# Patient Record
Sex: FEMALE | Race: WHITE | NOT HISPANIC OR LATINO | Employment: FULL TIME | URBAN - METROPOLITAN AREA
[De-identification: names, ages, dates, MRNs, and addresses within clinical notes are randomized per-mention and may not be internally consistent; named-entity substitution may affect disease eponyms.]

---

## 2024-01-08 ENCOUNTER — BIOMETRIC VISIT (OUTPATIENT)
Dept: PRIMARY CARE | Facility: EXTERNAL LOCATION | Age: 29
End: 2024-01-08
Payer: COMMERCIAL

## 2024-01-08 VITALS — DIASTOLIC BLOOD PRESSURE: 70 MMHG | SYSTOLIC BLOOD PRESSURE: 110 MMHG | WEIGHT: 150 LBS | BODY MASS INDEX: 28.34 KG/M2

## 2024-01-24 ENCOUNTER — BIOMETRIC VISIT (OUTPATIENT)
Dept: PRIMARY CARE | Facility: EXTERNAL LOCATION | Age: 29
End: 2024-01-24
Payer: COMMERCIAL

## 2024-01-24 VITALS — BODY MASS INDEX: 28.08 KG/M2 | WEIGHT: 148.6 LBS

## 2024-01-24 DIAGNOSIS — Z00.8 ENCOUNTER FOR BIOMETRIC SCREENING: Primary | ICD-10-CM

## 2024-01-24 LAB — POC FINGERSTICK BLOOD GLUCOSE: 100 MG/DL (ref 70–100)

## 2024-02-02 ENCOUNTER — BIOMETRIC VISIT (OUTPATIENT)
Dept: PRIMARY CARE | Facility: EXTERNAL LOCATION | Age: 29
End: 2024-02-02
Payer: COMMERCIAL

## 2024-02-02 VITALS — BODY MASS INDEX: 27.85 KG/M2 | WEIGHT: 147.4 LBS

## 2024-02-09 ENCOUNTER — BIOMETRIC VISIT (OUTPATIENT)
Dept: PRIMARY CARE | Facility: EXTERNAL LOCATION | Age: 29
End: 2024-02-09
Payer: COMMERCIAL

## 2024-02-09 VITALS — BODY MASS INDEX: 27.85 KG/M2 | WEIGHT: 147.4 LBS

## 2024-02-23 ENCOUNTER — BIOMETRIC VISIT (OUTPATIENT)
Dept: PRIMARY CARE | Facility: EXTERNAL LOCATION | Age: 29
End: 2024-02-23
Payer: COMMERCIAL

## 2024-02-23 VITALS — BODY MASS INDEX: 27.59 KG/M2 | WEIGHT: 146 LBS

## 2024-03-08 ENCOUNTER — BIOMETRIC VISIT (OUTPATIENT)
Dept: PRIMARY CARE | Facility: EXTERNAL LOCATION | Age: 29
End: 2024-03-08
Payer: COMMERCIAL

## 2024-03-08 VITALS — WEIGHT: 143.2 LBS | BODY MASS INDEX: 27.06 KG/M2

## 2024-03-15 ENCOUNTER — BIOMETRIC VISIT (OUTPATIENT)
Dept: PRIMARY CARE | Facility: EXTERNAL LOCATION | Age: 29
End: 2024-03-15
Payer: COMMERCIAL

## 2024-03-15 VITALS — BODY MASS INDEX: 27.44 KG/M2 | WEIGHT: 145.2 LBS

## 2024-03-26 DIAGNOSIS — R11.2 NAUSEA AND VOMITING, UNSPECIFIED VOMITING TYPE: Primary | ICD-10-CM

## 2024-03-26 DIAGNOSIS — G43.809 OTHER MIGRAINE WITHOUT STATUS MIGRAINOSUS, NOT INTRACTABLE: Primary | ICD-10-CM

## 2024-03-27 RX ORDER — ONDANSETRON 8 MG/1
TABLET, ORALLY DISINTEGRATING ORAL
Qty: 30 TABLET | Refills: 2 | Status: SHIPPED | OUTPATIENT
Start: 2024-03-27

## 2024-03-27 RX ORDER — SUMATRIPTAN 50 MG/1
TABLET, FILM COATED ORAL
Qty: 9 TABLET | Refills: 1 | Status: SHIPPED | OUTPATIENT
Start: 2024-03-27

## 2024-04-05 ENCOUNTER — OFFICE VISIT (OUTPATIENT)
Dept: PRIMARY CARE | Facility: EXTERNAL LOCATION | Age: 29
End: 2024-04-05
Payer: COMMERCIAL

## 2024-04-05 VITALS
BODY MASS INDEX: 27 KG/M2 | SYSTOLIC BLOOD PRESSURE: 112 MMHG | HEART RATE: 76 BPM | RESPIRATION RATE: 16 BRPM | DIASTOLIC BLOOD PRESSURE: 60 MMHG | HEIGHT: 61 IN | WEIGHT: 143 LBS | OXYGEN SATURATION: 98 % | TEMPERATURE: 98.1 F

## 2024-04-05 DIAGNOSIS — L71.0 PERIORIFICIAL DERMATITIS: Primary | ICD-10-CM

## 2024-04-05 PROBLEM — Z00.8 ENCOUNTER FOR BIOMETRIC SCREENING: Status: RESOLVED | Noted: 2024-01-24 | Resolved: 2024-04-05

## 2024-04-05 PROBLEM — G43.909 MIGRAINE: Status: ACTIVE | Noted: 2024-04-05

## 2024-04-05 PROBLEM — K58.9 IBS (IRRITABLE BOWEL SYNDROME): Status: ACTIVE | Noted: 2024-04-05

## 2024-04-05 PROBLEM — N87.9 DYSPLASIA OF CERVIX: Status: ACTIVE | Noted: 2024-04-05

## 2024-04-05 PROBLEM — D84.9 IMMUNE DEFICIENCY DISORDER (MULTI): Status: ACTIVE | Noted: 2024-04-05

## 2024-04-05 PROBLEM — D80.1 HYPOGAMMAGLOBULINEMIA (MULTI): Status: ACTIVE | Noted: 2024-04-05

## 2024-04-05 PROBLEM — F41.9 ANXIETY: Status: ACTIVE | Noted: 2024-04-05

## 2024-04-05 PROBLEM — B02.9 HERPES ZOSTER WITHOUT COMPLICATION: Status: ACTIVE | Noted: 2024-04-05

## 2024-04-05 PROBLEM — L30.8 PRURITIC DERMATITIS: Status: ACTIVE | Noted: 2024-04-05

## 2024-04-05 RX ORDER — METRONIDAZOLE 7.5 MG/G
GEL TOPICAL DAILY
Qty: 45 G | Refills: 0 | Status: SHIPPED | OUTPATIENT
Start: 2024-04-05 | End: 2024-04-15

## 2024-04-05 ASSESSMENT — ENCOUNTER SYMPTOMS
FEVER: 0
CHILLS: 0
COUGH: 0
SHORTNESS OF BREATH: 0

## 2024-04-05 NOTE — PROGRESS NOTES
"Subjective   Patient ID: Liz Carson is a 29 y.o. female who presents for Rash (Dryness on face around nose x 2 weeks, itchy).    HPI:  Itchy, irritated rash around nose for 2 weeks. No new lotions, detergents or medications.   No other symptoms     No Known Allergies    Current Outpatient Medications on File Prior to Visit   Medication Sig    levonorgestreL-ethinyl estrad (Altavera, 28,) 0.15-0.03 mg tablet TAKE 1 TABLET BY MOUTH EVERY DAY    ondansetron ODT (Zofran-ODT) 8 mg disintegrating tablet TAKE 1/2-1 TABLET EVERY 8 HOURS AS NEEDED FOR NAUSEA AND VOMITING    SUMAtriptan (Imitrex) 50 mg tablet TAKE 1/2-1 TABLET BY MOUTH TWICE DAILY AS NEEDED FOR HEADACHE    fluticasone (Flonase) 50 mcg/actuation nasal spray Administer 2 sprays into each nostril once daily for 10 days. Shake gently. Before first use, prime pump. After use, clean tip and replace cap.     No current facility-administered medications on file prior to visit.        Past Medical History:   Diagnosis Date    Anxiety     Dysplasia of cervix     Herpes zoster without complication     Hypogammaglobulinemia (CMS/HCC)     IBS (irritable bowel syndrome)     Immune deficiency disorder (CMS/HCC)     Migraine     Pruritic dermatitis        Past Surgical History:   Procedure Laterality Date    ANTERIOR CRUCIATE LIGAMENT REPAIR Left     TONSILLECTOMY  12/15/2014    Tonsillectomy       Review of Systems   Constitutional:  Negative for chills and fever.   Respiratory:  Negative for cough and shortness of breath.    Cardiovascular:  Negative for chest pain.   Skin:  Positive for rash (itchy).       Objective   Visit Vitals  /60   Pulse 76   Temp 36.7 °C (98.1 °F)   Resp 16   Ht 1.549 m (5' 1\")   Wt 64.9 kg (143 lb)   LMP 03/20/2024   SpO2 98%   BMI 27.02 kg/m²   Smoking Status Never   BSA 1.67 m²       Physical Exam  Constitutional:       General: She is not in acute distress.     Appearance: Normal appearance. She is not ill-appearing or toxic-appearing. "   Pulmonary:      Effort: Pulmonary effort is normal.      Breath sounds: Normal breath sounds.   Skin:     General: Skin is warm.      Comments: Multiple erythematous papules and mild scale are present on the perinasal skin   Neurological:      Mental Status: She is alert.         Assessment/Plan   Diagnoses and all orders for this visit:  Periorificial dermatitis  -     metroNIDAZOLE (Metrogel) 0.75 % gel; Apply topically once daily for 10 days.    - Instructed to use gentle face soap.   - Limit makeup and avoid any new products until symptoms resolve.   - Instructed to follow up with PCP in 2 weeks if no improvement. Sooner with concerns or worsening symptoms.

## 2024-06-04 ENCOUNTER — LAB (OUTPATIENT)
Dept: LAB | Facility: LAB | Age: 29
End: 2024-06-04
Payer: COMMERCIAL

## 2024-06-04 ENCOUNTER — OFFICE VISIT (OUTPATIENT)
Dept: PRIMARY CARE | Facility: CLINIC | Age: 29
End: 2024-06-04
Payer: COMMERCIAL

## 2024-06-04 VITALS
WEIGHT: 144.8 LBS | HEART RATE: 78 BPM | BODY MASS INDEX: 27.34 KG/M2 | OXYGEN SATURATION: 98 % | HEIGHT: 61 IN | DIASTOLIC BLOOD PRESSURE: 70 MMHG | SYSTOLIC BLOOD PRESSURE: 118 MMHG

## 2024-06-04 DIAGNOSIS — Z11.4 ENCOUNTER FOR SCREENING FOR HIV: ICD-10-CM

## 2024-06-04 DIAGNOSIS — Z13.220 LIPID SCREENING: ICD-10-CM

## 2024-06-04 DIAGNOSIS — G43.909 MIGRAINE WITHOUT STATUS MIGRAINOSUS, NOT INTRACTABLE, UNSPECIFIED MIGRAINE TYPE: ICD-10-CM

## 2024-06-04 DIAGNOSIS — Z00.00 ANNUAL PHYSICAL EXAM: Primary | ICD-10-CM

## 2024-06-04 DIAGNOSIS — Z00.00 ANNUAL PHYSICAL EXAM: ICD-10-CM

## 2024-06-04 DIAGNOSIS — Z11.59 ENCOUNTER FOR HEPATITIS C SCREENING TEST FOR LOW RISK PATIENT: ICD-10-CM

## 2024-06-04 LAB
CHOLEST SERPL-MCNC: 206 MG/DL (ref 0–199)
CHOLESTEROL/HDL RATIO: 4.2
HCV AB SER QL: NONREACTIVE
HDLC SERPL-MCNC: 49.6 MG/DL
HIV 1+2 AB+HIV1 P24 AG SERPL QL IA: NONREACTIVE
LDLC SERPL CALC-MCNC: 116 MG/DL
NON HDL CHOLESTEROL: 156 MG/DL (ref 0–149)
TRIGL SERPL-MCNC: 201 MG/DL (ref 0–149)
VLDL: 40 MG/DL (ref 0–40)

## 2024-06-04 PROCEDURE — 80061 LIPID PANEL: CPT

## 2024-06-04 PROCEDURE — 99395 PREV VISIT EST AGE 18-39: CPT | Performed by: FAMILY MEDICINE

## 2024-06-04 PROCEDURE — 36415 COLL VENOUS BLD VENIPUNCTURE: CPT

## 2024-06-04 PROCEDURE — 1036F TOBACCO NON-USER: CPT | Performed by: FAMILY MEDICINE

## 2024-06-04 PROCEDURE — 87389 HIV-1 AG W/HIV-1&-2 AB AG IA: CPT

## 2024-06-04 PROCEDURE — 86803 HEPATITIS C AB TEST: CPT

## 2024-06-04 RX ORDER — DOXYCYCLINE 100 MG/1
100 CAPSULE ORAL 2 TIMES DAILY
COMMUNITY
Start: 2024-04-23

## 2024-06-04 ASSESSMENT — PAIN SCALES - GENERAL: PAINLEVEL: 0-NO PAIN

## 2024-06-04 ASSESSMENT — ENCOUNTER SYMPTOMS: DEPRESSION: 0

## 2024-06-04 NOTE — PROGRESS NOTES
"Subjective   Patient ID: Liz Carson is a 29 y.o. female who presents for Annual Exam (Annual physical exam, patient is fasting. ).    HPI   Here for annual.  Doing well.  Works as .  Gets migraines with menses.  Menstrual cycles are light with her birth control pill.  She has been on it many years.    Review of Systems  Cardiovascular: no chest pain, no edema, no palpitations, and no syncope.   Respiratory: no cough,no shortness of breath, and no wheezing  Gastrointestinal: no abdominal pain, nausea, vomiting or blood in stools  Genitourinary: no dysuria or hematuria    Objective   /70 (BP Location: Left arm, Patient Position: Sitting, BP Cuff Size: Adult)   Pulse 78   Ht 1.562 m (5' 1.5\")   Wt 65.7 kg (144 lb 12.8 oz)   LMP 05/20/2024   SpO2 98%   BMI 26.92 kg/m²     Physical Exam  Alert, pleasant and in no acute distress.  Neck: Supple, no JVD or carotid bruit  Heart: Regular rate and rhythm, no murmur  Lungs: Clear to auscultation  Lower extremities: No edema  \  Assessment/Plan   Problem List Items Addressed This Visit             ICD-10-CM    Migraine G43.909     Other Visit Diagnoses         Codes    Annual physical exam    -  Primary Z00.00    Relevant Orders    Lipid Panel    Lipid screening     Z13.220    Relevant Orders    Lipid Panel    Encounter for hepatitis C screening test for low risk patient     Z11.59    Relevant Orders    Hepatitis C antibody    Encounter for screening for HIV     Z11.4    Relevant Orders    HIV 1/2 Antigen/Antibody Screen with Reflex to Confirmation        Pt.herefor annual.  Doing well.  For migraines, trial ibuprofen with sumatriptin.  Also trial B2 at 400mg daily.  Follow with gyn for possible BCP change  Lipid panel, HIV and HepC screen       " walking

## 2024-10-30 DIAGNOSIS — Z30.9 ENCOUNTER FOR CONTRACEPTIVE MANAGEMENT, UNSPECIFIED TYPE: ICD-10-CM

## 2024-10-30 RX ORDER — LEVONORGESTREL AND ETHINYL ESTRADIOL 0.15-0.03
1 KIT ORAL DAILY
Qty: 84 TABLET | Refills: 1 | Status: SHIPPED | OUTPATIENT
Start: 2024-10-30

## 2025-02-10 ENCOUNTER — APPOINTMENT (OUTPATIENT)
Dept: PRIMARY CARE | Facility: EXTERNAL LOCATION | Age: 30
End: 2025-02-10

## 2025-02-10 ENCOUNTER — OFFICE VISIT (OUTPATIENT)
Dept: PRIMARY CARE | Facility: EXTERNAL LOCATION | Age: 30
End: 2025-02-10
Payer: COMMERCIAL

## 2025-02-10 VITALS
TEMPERATURE: 97.2 F | WEIGHT: 141 LBS | SYSTOLIC BLOOD PRESSURE: 113 MMHG | HEART RATE: 91 BPM | DIASTOLIC BLOOD PRESSURE: 75 MMHG | OXYGEN SATURATION: 99 % | BODY MASS INDEX: 26.21 KG/M2

## 2025-02-10 DIAGNOSIS — J06.9 VIRAL UPPER RESPIRATORY TRACT INFECTION: ICD-10-CM

## 2025-02-10 DIAGNOSIS — J02.9 SORE THROAT: Primary | ICD-10-CM

## 2025-02-10 LAB
POC BINAX EXPIRATION: NORMAL
POC BINAX NOW COVID SERIAL NUMBER: NORMAL
POC RAPID STREP: NEGATIVE
POC SARS-COV-2 AG BINAX: NORMAL

## 2025-02-10 ASSESSMENT — ENCOUNTER SYMPTOMS
SORE THROAT: 1
WHEEZING: 0
FEVER: 0
ABDOMINAL PAIN: 0
CHILLS: 0
TROUBLE SWALLOWING: 1
ACTIVITY CHANGE: 0
EYE ITCHING: 0
NAUSEA: 0
VOICE CHANGE: 0
SHORTNESS OF BREATH: 0
MYALGIAS: 0
DIARRHEA: 0
VOMITING: 0
RHINORRHEA: 0
EYE PAIN: 0
COUGH: 1
FATIGUE: 1
SINUS PAIN: 0
SINUS PRESSURE: 1
HEADACHES: 1
DIAPHORESIS: 0
PSYCHIATRIC NEGATIVE: 1
APPETITE CHANGE: 0
EYE REDNESS: 0

## 2025-02-10 NOTE — PROGRESS NOTES
Subjective   Patient ID: Liz Carson is a 29 y.o. female who presents for c/o sore throat. Nasal congestion, body aches,chills.     Pt presents with c/o sore throat, body aches, nasal congestion, sob with activity. Abd cramping. 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 Pt states feels like she is swallowing glass. Hx of tonsillectomy. Significant other whom she lives with is also ill with same s/s.     URI   This is a new problem. The current episode started in the past 7 days. There has been no fever. Associated symptoms include congestion, coughing, ear pain, headaches, a plugged ear sensation and a sore throat. Pertinent negatives include no abdominal pain, chest pain, diarrhea, nausea, rhinorrhea, sinus pain, sneezing, vomiting or wheezing. She has tried acetaminophen (mucinex) for the symptoms.        Review of Systems   Constitutional:  Positive for fatigue. Negative for activity change, appetite change, chills, diaphoresis and fever.   HENT:  Positive for congestion, ear pain, postnasal drip, sinus pressure, sore throat and trouble swallowing (pain). Negative for rhinorrhea, sinus pain, sneezing and voice change.    Eyes:  Negative for pain, redness and itching.   Respiratory:  Positive for cough. Negative for shortness of breath and wheezing.    Cardiovascular:  Negative for chest pain.   Gastrointestinal:  Negative for abdominal pain, diarrhea, nausea and vomiting.   Genitourinary:  Negative for decreased urine volume.   Musculoskeletal:  Negative for myalgias.   Neurological:  Positive for headaches.   Psychiatric/Behavioral: Negative.         Objective   /75   Pulse 91   Temp 36.2 °C (97.2 °F)   Wt 64 kg (141 lb)   SpO2 99%   BMI 26.21 kg/m²     Physical Exam  Vitals and nursing note reviewed.   Constitutional:       General: She is not in acute distress.     Appearance: Normal appearance.   HENT:      Head: Normocephalic.      Right Ear: Tympanic membrane, ear canal and external ear normal. There  is no impacted cerumen.      Left Ear: Tympanic membrane, ear canal and external ear normal. There is no impacted cerumen.      Nose: Rhinorrhea present. No congestion.      Mouth/Throat:      Mouth: Mucous membranes are moist.      Pharynx: Oropharynx is clear. Posterior oropharyngeal erythema present. No oropharyngeal exudate.   Eyes:      Conjunctiva/sclera: Conjunctivae normal.   Cardiovascular:      Rate and Rhythm: Normal rate and regular rhythm.      Heart sounds: No murmur heard.  Pulmonary:      Effort: Pulmonary effort is normal. No respiratory distress.      Breath sounds: Normal breath sounds. No stridor. No wheezing, rhonchi or rales.   Lymphadenopathy:      Cervical: No cervical adenopathy.   Skin:     General: Skin is warm and dry.   Neurological:      General: No focal deficit present.      Mental Status: She is alert. Mental status is at baseline.   Psychiatric:         Mood and Affect: Mood normal.         Behavior: Behavior normal.         Thought Content: Thought content normal.         Judgment: Judgment normal.         Assessment/Plan   Diagnoses and all orders for this visit:  Sore throat  -     POCT Rapid Strep A manually resulted  -     POCT BinaxNOW Covid-19 Ag Card manually resulted  Viral upper respiratory tract infection    -rapid strep negative  -rapid covid negative  -decline flu send out today.   -discussed parameters for tamiflu, in the case that it is flu, and since pt is outside 2-3 day window will not send flu testing or start tamiflu at this time.   -s/s consistent with viral URI  -discussed OTC meds that may help lessen s/s.     Upper respiratory infection-    Get plenty of rest and maintain hydration.    May use humidification in sleeping areas.  Use saline nasal spray to thin mucous.   May use throat lozenges, salt water gargles, or chloraseptic spray as needed to relieve sore throat.    OTC Cold Medications:  Tylenol/Ibuprofen- pain control and fever management  Guaifenesin -  thins mucus - take with a full glass of water for best effect  Dextromethorphan - cough suppressant  Decongestants - dry up mucus - pseudoephedrine (stronger, but more side effects) and phenylephrine - be cautious if high blood pressure  Flonase- nasal steroid to improve nasal inflammation.    Use good hand hygiene to prevent the spread of germs.    Follow up if symptoms persist greater than 7 days.  Seek emergency medical care if fever above 104F, difficulty swallowing, or difficulty breathing occurs.

## 2025-02-10 NOTE — PROGRESS NOTES
Subjective   Patient ID: Liz Carson is a 29 y.o. female who presents for c/o sinus congestion and cold symptoms.     Pt presents with c/o     URI        Review of Systems    Objective   There were no vitals taken for this visit.    Physical Exam    Assessment/Plan   {Assess/[email or web address]:[id number]}